# Patient Record
Sex: FEMALE | Race: WHITE | NOT HISPANIC OR LATINO | Employment: FULL TIME | URBAN - METROPOLITAN AREA
[De-identification: names, ages, dates, MRNs, and addresses within clinical notes are randomized per-mention and may not be internally consistent; named-entity substitution may affect disease eponyms.]

---

## 2017-04-19 ENCOUNTER — TELEPHONE (OUTPATIENT)
Dept: INTERNAL MEDICINE | Facility: CLINIC | Age: 32
End: 2017-04-19

## 2017-04-19 NOTE — TELEPHONE ENCOUNTER
S/w pt advising her unable to fill either citalopram or clonazepam without appt with Dr. Wolf, d/t pt previous records shows hasn't been seen by him since 02/2016 (over 1 year). Pt stated unable to pay cash price to see him at [facility], advised pt to go to nearest ER or call insurance for available provider that can see her as soon as possible to evaluate and treat her, she voiced understanding.

## 2017-04-19 NOTE — TELEPHONE ENCOUNTER
----- Message from Angela Mas sent at 4/19/2017  4:30 PM CDT -----  Contact: pt  Pt request call back concerning a prescription refill, pt can be reached at [phone]//thxMW

## 2018-09-12 ENCOUNTER — LAB VISIT (OUTPATIENT)
Dept: LAB | Facility: HOSPITAL | Age: 33
End: 2018-09-12
Attending: NURSE PRACTITIONER
Payer: COMMERCIAL

## 2018-09-12 ENCOUNTER — OFFICE VISIT (OUTPATIENT)
Dept: OBSTETRICS AND GYNECOLOGY | Facility: CLINIC | Age: 33
End: 2018-09-12
Payer: COMMERCIAL

## 2018-09-12 VITALS
BODY MASS INDEX: 34.55 KG/M2 | WEIGHT: 202.38 LBS | HEIGHT: 64 IN | SYSTOLIC BLOOD PRESSURE: 118 MMHG | DIASTOLIC BLOOD PRESSURE: 82 MMHG

## 2018-09-12 DIAGNOSIS — Z01.419 ENCOUNTER FOR GYNECOLOGICAL EXAMINATION WITHOUT ABNORMAL FINDING: Primary | ICD-10-CM

## 2018-09-12 DIAGNOSIS — F17.200 SMOKER: ICD-10-CM

## 2018-09-12 DIAGNOSIS — Z11.3 SCREENING FOR STD (SEXUALLY TRANSMITTED DISEASE): ICD-10-CM

## 2018-09-12 DIAGNOSIS — Z30.432 ENCOUNTER FOR IUD REMOVAL: ICD-10-CM

## 2018-09-12 PROCEDURE — 87491 CHLMYD TRACH DNA AMP PROBE: CPT

## 2018-09-12 PROCEDURE — 58301 REMOVE INTRAUTERINE DEVICE: CPT | Mod: S$GLB,,, | Performed by: NURSE PRACTITIONER

## 2018-09-12 PROCEDURE — 86703 HIV-1/HIV-2 1 RESULT ANTBDY: CPT

## 2018-09-12 PROCEDURE — 80074 ACUTE HEPATITIS PANEL: CPT

## 2018-09-12 PROCEDURE — 99999 PR PBB SHADOW E&M-EST. PATIENT-LVL III: CPT | Mod: PBBFAC,,, | Performed by: NURSE PRACTITIONER

## 2018-09-12 PROCEDURE — 88175 CYTOPATH C/V AUTO FLUID REDO: CPT

## 2018-09-12 PROCEDURE — 36415 COLL VENOUS BLD VENIPUNCTURE: CPT | Mod: PO

## 2018-09-12 PROCEDURE — 99385 PREV VISIT NEW AGE 18-39: CPT | Mod: 25,S$GLB,, | Performed by: NURSE PRACTITIONER

## 2018-09-12 PROCEDURE — 86592 SYPHILIS TEST NON-TREP QUAL: CPT

## 2018-09-12 NOTE — PATIENT INSTRUCTIONS
Kicking the Smoking Habit  If you smoke, quitting is one of the best changes you can make for your heart and your overall health. Your risk of heart attack goes down within one day of putting out that last cigarette. As you go longer without smoking, your risk goes down even more. Quitting isnt easy, but millions of people have done it. You can, too. Its never too late to quit.  Getting started  Boost your chances of success by deciding on your quit plan. Your health care provider and cardiac rehab team can help you develop this plan. Even if youve already quit, its easy to slip back into smoking.  Your plan can help you avoid and recover from relapse.  In any case, start by setting a date to quit within a month, and do it.    Keys to your quit plan  · Talk to your healthcare provider about prescription medicines and nicotine replacement products that help stop the urge to smoke.   · Join a support group or quit smoking program. Talking with others about the challenges of quitting can help you get through them.  · Ask other smokers in your household to quit with you.  · Look for the cues in your life that you associate with smoking and avoid them.  Track your triggers  What gives you that Z-cpsj-y-cigarette feeling? List all the situations that make you want a cigarette. Then think of other ways to deal with these situations. Here are some examples:  Situation How I'll handle it   Finishing a meal Get up from the table and take a walk   Having an argument Find a quiet place and breathe deeply   Feeling lonely or bored Call a friend to talk         Tips for quitting successfully  · List the benefits of quitting such as reducing heart risks and saving money. Keep this list and review it whenever you feel like smoking.  · Get support. Let your friends know you may call them to chat when you have an urge to smoke.  · If youve tried to quit before without success, this time avoid the triggers that may cause  the relapse.  · Make the most of slip-ups. Try to learn from them, and then get back on track.  · Be accountable to your friends and your calendar so that you stay on track.  For family and friends  · Be supportive and patient. Quitting smoking can be difficult and stressful.  · If you smoke, nows a great time to quit. Even if you dont quit, never smoke around your loved one. Secondhand smoke is dangerous to his or her heart.  · The best goals are accomplished in teams. Remember that when your loved one states he or she wants to stop smoking.  Date Last Reviewed: 7/1/2016  © 4938-8856 [employer]. [address] [place] [address], [place] [address]. All rights reserved. This information is not intended as a substitute for professional medical care. Always follow your healthcare professional's instructions.        The Range of Pap Test Results  When your Pap test is sent to the lab, the lab studies your cell samples and reports any abnormal cell changes. Your health care provider can discuss these changes with you. In some cases, an abnormal Pap test is due to an infection. More serious cell changes range from dysplasia to cancer. Talk to your health care provider about your Pap test.    Normal results  Cervical cells, even normal ones, are always changing. As they mature, normal squamous cells move from deeper layers within the cervix. Over time, these cells flatten and cover the surface of the cervix. Within the cervical canal, the cells are different. These glandular cells are taller and not as flat as the cells on the surface of the cervix. When a Pap test sample shows healthy cells of both types, the results are negative. Keep having Pap tests as often as directed.  Abnormal results  A positive Pap test result means some cells in the sample showed abnormal changes. These results are grouped by the type of cell change and the location, or extent, of the changes. Depending on the results, you may need  further testing.  · Inflammation: Noncancerous changes are present. They may be due to normal cell repair. Or, they may be caused by an infection, such as HPV or yeast. Further testing may be needed. (Also called reactive cellular changes.)  · Atypical squamous cells: Test results are unclear. Cells on the surface of the cervix show changes, but their significance is not yet known. Testing for HPV and other sexually transmitted infections (STIs) may be needed. Treatment may be required. (Reported as ASC-US or ASC-H.)  · Atypical glandular cells: Cells lining the cervical canal show abnormal changes. Further testing is likely. You may also have treatment to destroy or remove problem cells. (Reported as AGC.)  · Mild dysplasia: Cells show distinct changes. More testing or HPV typing may be done. You may also have treatment to destroy or remove problem cells. (Reported as low-grade RAFIQ or REYNOLD 1.)  · Moderate to severe dysplasia: Cells show precancerous changes. Or, noninvasive cancer (carcinoma in situ) may be present. Treatment to destroy or remove problem cells is likely. (Reported as high-grade RAFIQ or REYNOLD 2 or REYNOLD 3.)  · Cancer: Different types of cancer may be detected by your Pap test. More tests to assess the cancer's extent are likely. The type of treatment will depend on the test results and other factors, such as age and health history. (Reported as squamous cell carcinoma, endocervical adenocarcinoma in situ, or adenocarcinoma.)  Date Last Reviewed: 5/12/2015  © 3882-0118 [employer]. [address] [place] [address], [place] [address]. All rights reserved. This information is not intended as a substitute for professional medical care. Always follow your healthcare professional's instructions.

## 2018-09-12 NOTE — PROGRESS NOTES
"CC: Well woman exam    Veronika Chávez is a 32 y.o. female  presents for well woman exam.  LMP: No LMP recorded. Patient is not currently having periods (Reason: Birth Control)..    IUD was placed at other gyn in 2017.  Pt states just has not felt herself since placement, dimas, emotion labile.  Does not want pregnancy and wants other options for birth control.  She smokes 1/2 pack a day of cigarettes.  Discussed limited options for contraception: ParaGard IUD, depo provera, Nexplanon, condoms.      Past Medical History:   Diagnosis Date    Anxiety      Past Surgical History:   Procedure Laterality Date    DILATION AND CURETTAGE OF UTERUS       Social History     Socioeconomic History    Marital status: Single     Spouse name: Not on file    Number of children: Not on file    Years of education: Not on file    Highest education level: Not on file   Social Needs    Financial resource strain: Not on file    Food insecurity - worry: Not on file    Food insecurity - inability: Not on file    Transportation needs - medical: Not on file    Transportation needs - non-medical: Not on file   Occupational History    Not on file   Tobacco Use    Smoking status: Current Every Day Smoker   Substance and Sexual Activity    Alcohol use: Yes    Drug use: No    Sexual activity: Yes     Partners: Male     Birth control/protection: None   Other Topics Concern    Not on file   Social History Narrative    Not on file     Family History   Problem Relation Age of Onset    Stroke Maternal Grandmother     Hypertension Maternal Grandfather      OB History      Para Term  AB Living    1 0 0 0 1 0    SAB TAB Ectopic Multiple Live Births    0 1 0 0            /82   Ht 5' 4" (1.626 m)   Wt 91.8 kg (202 lb 6.1 oz)   BMI 34.74 kg/m²       ROS:  GENERAL: Denies weight gain or weight loss. Feeling well overall.   SKIN: Denies rash or lesions.   HEAD: Denies head injury or headache. "   NODES: Denies enlarged lymph nodes.   CHEST: Denies chest pain or shortness of breath.   CARDIOVASCULAR: Denies palpitations or left sided chest pain.   ABDOMEN: No abdominal pain, constipation, diarrhea, nausea, vomiting or rectal bleeding.   URINARY: No frequency, dysuria, hematuria, or burning on urination.  REPRODUCTIVE: See HPI.   BREASTS: The patient performs breast self-examination and denies pain, lumps, or nipple discharge.   HEMATOLOGIC: No easy bruisability or excessive bleeding.   MUSCULOSKELETAL: Denies joint pain or swelling.   NEUROLOGIC: Denies syncope or weakness.   PSYCHIATRIC: Denies depression, anxiety or mood swings.    PHYSICAL EXAM:  APPEARANCE: Well nourished, well developed, in no acute distress.  AFFECT: WNL, alert and oriented x 3  SKIN: No acne or hirsutism  NECK: Neck symmetric without masses or thyromegaly  NODES: No inguinal, cervical, axillary, or femoral lymph node enlargement  CHEST: Good respiratory effect  ABDOMEN: Soft.  No tenderness or masses.  No hepatosplenomegaly.  No hernias.  BREASTS: Symmetrical, no skin changes or visible lesions.  No palpable masses, nipple discharge bilaterally.  PELVIC: Normal external genitalia without lesions.  Normal hair distribution.  Adequate perineal body, normal urethral meatus.  Vagina moist and well rugated without lesions or discharge.  Cervix pink, without lesions, discharge or tenderness.  No significant cystocele or rectocele.  Bimanual exam shows uterus to be normal size, regular, mobile and nontender.  Adnexa without masses or tenderness.    EXTREMITIES: No edema.  Physical Exam     CC: Removal of IUD    Veronika Chávez is a 32 y.o. female  presents for IUD removal because does not like how it effects her emotions - offered antidepressant since IUD has not been in but about 9 mths she declines and would like it removed today.      PRE-IUD REMOVAL COUNSELING:  The patient was advised of minimal risks of bleeding and pain and  she agrees to proceed.    PROCEDURE:  TIME OUT PERFORMED.  IUD strings were  visualized at the os and grasped. IUD removed with gentle traction.  The patient tolerated the procedure well.    ASSESSMENT:  Contraceptive Management / Removal IUD.    POST IUD REMOVAL COUNSELING:  Expect period-like flow to occur after Mirena IUD removal and periods to return to pre-IUD pattern.  Manage post IUD removal cramping with NSAIDs, Tylenol or Rx per [employer].    POST IUD REMOVAL CONTRACEPTION:  Condom use, paragard pamphlet given  Stop smoking and options increase    Counseling lasted approximately 15 minutes and all her questions were answered.    FOLLOW-UP: With me for annual gyn exam or prn.    1. Encounter for gynecological examination without abnormal finding  Liquid-based pap smear, screening   2. Screening for STD (sexually transmitted disease)  C. trachomatis/N. gonorrhoeae by AMP DNA    HIV-1 and HIV-2 antibodies    Hepatitis panel, acute    RPR   3. Encounter for IUD removal  Removal of Intrauterine Device-Today   4. Smoker      AND PLAN:    Patient was counseled today on A.C.S. Pap guidelines and recommendations for yearly pelvic exams, mammograms and monthly self breast exams; to see her PCP for other health maintenance.

## 2018-09-13 LAB
C TRACH DNA SPEC QL NAA+PROBE: NOT DETECTED
HAV IGM SERPL QL IA: NEGATIVE
HBV CORE IGM SERPL QL IA: NEGATIVE
HBV SURFACE AG SERPL QL IA: NEGATIVE
HCV AB SERPL QL IA: NEGATIVE
HIV 1+2 AB+HIV1 P24 AG SERPL QL IA: NEGATIVE
N GONORRHOEA DNA SPEC QL NAA+PROBE: NOT DETECTED
RPR SER QL: NORMAL

## 2018-09-14 ENCOUNTER — PATIENT MESSAGE (OUTPATIENT)
Dept: OBSTETRICS AND GYNECOLOGY | Facility: CLINIC | Age: 33
End: 2018-09-14

## 2018-09-17 ENCOUNTER — PATIENT MESSAGE (OUTPATIENT)
Dept: OBSTETRICS AND GYNECOLOGY | Facility: CLINIC | Age: 33
End: 2018-09-17

## 2018-11-14 ENCOUNTER — OFFICE VISIT (OUTPATIENT)
Dept: INTERNAL MEDICINE | Facility: CLINIC | Age: 33
End: 2018-11-14
Payer: COMMERCIAL

## 2018-11-14 ENCOUNTER — LAB VISIT (OUTPATIENT)
Dept: LAB | Facility: HOSPITAL | Age: 33
End: 2018-11-14
Attending: NURSE PRACTITIONER
Payer: COMMERCIAL

## 2018-11-14 VITALS
DIASTOLIC BLOOD PRESSURE: 84 MMHG | TEMPERATURE: 98 F | BODY MASS INDEX: 34.45 KG/M2 | HEIGHT: 64 IN | OXYGEN SATURATION: 99 % | HEART RATE: 84 BPM | WEIGHT: 201.81 LBS | SYSTOLIC BLOOD PRESSURE: 108 MMHG

## 2018-11-14 DIAGNOSIS — R53.83 FATIGUE, UNSPECIFIED TYPE: Primary | ICD-10-CM

## 2018-11-14 DIAGNOSIS — Z72.0 TOBACCO USE: ICD-10-CM

## 2018-11-14 DIAGNOSIS — F17.200 CURRENT SMOKER: ICD-10-CM

## 2018-11-14 DIAGNOSIS — R53.83 FATIGUE, UNSPECIFIED TYPE: ICD-10-CM

## 2018-11-14 DIAGNOSIS — R06.02 SHORTNESS OF BREATH: ICD-10-CM

## 2018-11-14 DIAGNOSIS — H65.91 FLUID LEVEL BEHIND TYMPANIC MEMBRANE OF RIGHT EAR: ICD-10-CM

## 2018-11-14 LAB
25(OH)D3+25(OH)D2 SERPL-MCNC: 11 NG/ML
ALBUMIN SERPL BCP-MCNC: 3.9 G/DL
ALP SERPL-CCNC: 72 U/L
ALT SERPL W/O P-5'-P-CCNC: 14 U/L
ANION GAP SERPL CALC-SCNC: 9 MMOL/L
AST SERPL-CCNC: 16 U/L
BASOPHILS # BLD AUTO: 0.04 K/UL
BASOPHILS NFR BLD: 0.4 %
BILIRUB SERPL-MCNC: 0.4 MG/DL
BUN SERPL-MCNC: 10 MG/DL
CALCIUM SERPL-MCNC: 9.9 MG/DL
CHLORIDE SERPL-SCNC: 105 MMOL/L
CHOLEST SERPL-MCNC: 216 MG/DL
CHOLEST/HDLC SERPL: 4.7 {RATIO}
CO2 SERPL-SCNC: 25 MMOL/L
CREAT SERPL-MCNC: 0.7 MG/DL
DIFFERENTIAL METHOD: ABNORMAL
EOSINOPHIL # BLD AUTO: 0.5 K/UL
EOSINOPHIL NFR BLD: 5 %
ERYTHROCYTE [DISTWIDTH] IN BLOOD BY AUTOMATED COUNT: 12.4 %
EST. GFR  (AFRICAN AMERICAN): >60 ML/MIN/1.73 M^2
EST. GFR  (NON AFRICAN AMERICAN): >60 ML/MIN/1.73 M^2
GLUCOSE SERPL-MCNC: 98 MG/DL
HCT VFR BLD AUTO: 42.3 %
HDLC SERPL-MCNC: 46 MG/DL
HDLC SERPL: 21.3 %
HGB BLD-MCNC: 14.1 G/DL
LDLC SERPL CALC-MCNC: 140.4 MG/DL
LYMPHOCYTES # BLD AUTO: 3.2 K/UL
LYMPHOCYTES NFR BLD: 30.3 %
MCH RBC QN AUTO: 31.1 PG
MCHC RBC AUTO-ENTMCNC: 33.3 G/DL
MCV RBC AUTO: 93 FL
MONOCYTES # BLD AUTO: 0.4 K/UL
MONOCYTES NFR BLD: 4.1 %
NEUTROPHILS # BLD AUTO: 6.3 K/UL
NEUTROPHILS NFR BLD: 60.2 %
NONHDLC SERPL-MCNC: 170 MG/DL
PLATELET # BLD AUTO: 303 K/UL
PMV BLD AUTO: 9.8 FL
POTASSIUM SERPL-SCNC: 4.8 MMOL/L
PROT SERPL-MCNC: 7 G/DL
RBC # BLD AUTO: 4.54 M/UL
SODIUM SERPL-SCNC: 139 MMOL/L
TRIGL SERPL-MCNC: 148 MG/DL
TSH SERPL DL<=0.005 MIU/L-ACNC: 1.75 UIU/ML
VIT B12 SERPL-MCNC: 573 PG/ML
WBC # BLD AUTO: 10.46 K/UL

## 2018-11-14 PROCEDURE — 3008F BODY MASS INDEX DOCD: CPT | Mod: CPTII,S$GLB,, | Performed by: NURSE PRACTITIONER

## 2018-11-14 PROCEDURE — 80061 LIPID PANEL: CPT | Mod: PO

## 2018-11-14 PROCEDURE — 36415 COLL VENOUS BLD VENIPUNCTURE: CPT | Mod: PO

## 2018-11-14 PROCEDURE — 99213 OFFICE O/P EST LOW 20 MIN: CPT | Mod: S$GLB,,, | Performed by: NURSE PRACTITIONER

## 2018-11-14 PROCEDURE — 99999 PR PBB SHADOW E&M-EST. PATIENT-LVL III: CPT | Mod: PBBFAC,,, | Performed by: NURSE PRACTITIONER

## 2018-11-14 PROCEDURE — 82306 VITAMIN D 25 HYDROXY: CPT

## 2018-11-14 PROCEDURE — 82607 VITAMIN B-12: CPT

## 2018-11-14 PROCEDURE — 80053 COMPREHEN METABOLIC PANEL: CPT | Mod: PO

## 2018-11-14 PROCEDURE — 85025 COMPLETE CBC W/AUTO DIFF WBC: CPT | Mod: PO

## 2018-11-14 PROCEDURE — 84443 ASSAY THYROID STIM HORMONE: CPT

## 2018-11-14 NOTE — PROGRESS NOTES
Subjective:       Patient ID: Veronika Chávez is a 33 y.o. female.    Chief Complaint: Fatigue    Patient presents with fatigue that started within the past week. Went to [facility] urgent care for symptoms.  Reports she was negative for flu and strep.  Had CXR and says provider saw some scarring.  Patient is a current smoker (15 year history).  No lung disease.  Had pneumonia in the past.  Complains for some shortness of breath on exertion.  Mild palpitations.  Came in today for labs.  Has not had any in years. No PCP.  Wanting to establish with a provider.       Fatigue   This is a new problem. The current episode started in the past 7 days. The problem has been unchanged. Associated symptoms include chills and fatigue. Pertinent negatives include no arthralgias, congestion, coughing or headaches. Treatments tried: Mariluz Conde Elda Berry      Review of Systems   Constitutional: Positive for chills and fatigue.   HENT: Negative for congestion.    Respiratory: Positive for shortness of breath. Negative for cough.    Cardiovascular: Positive for palpitations.   Musculoskeletal: Negative for arthralgias and gait problem.   Neurological: Negative for dizziness and headaches.   Psychiatric/Behavioral: Negative for agitation and confusion.       Objective:      Physical Exam   Constitutional: She is oriented to person, place, and time. Vital signs are normal. She appears well-developed and well-nourished.   HENT:   Head: Normocephalic and atraumatic.   Left Ear: A middle ear effusion is present.   Neck: Normal range of motion.   Cardiovascular: Normal rate and regular rhythm.   Pulmonary/Chest: Effort normal and breath sounds normal.   Abdominal: Soft. Bowel sounds are normal.   Musculoskeletal: Normal range of motion. She exhibits no edema.   Neurological: She is alert and oriented to person, place, and time.   Skin: Skin is warm.   Psychiatric: She has a normal mood and affect. Her behavior is normal.        Assessment:       1. Fatigue, unspecified type    2. Current smoker    3. Shortness of breath    4. Tobacco use    5. Fluid level behind tympanic membrane of right ear        Plan:         Fatigue, unspecified type  -     CBC auto differential; Future; Expected date: 11/14/2018  -     Comprehensive metabolic panel; Future; Expected date: 11/14/2018  -     TSH; Future; Expected date: 11/14/2018  -     Vitamin D; Future; Expected date: 11/14/2018  -     Vitamin B12; Future; Expected date: 11/14/2018  -     Lipid panel; Future; Expected date: 11/14/2018    Current smoker  -     CT Chest Without Contrast; Future; Expected date: 11/14/2018    Shortness of breath  -     CT Chest Without Contrast; Future; Expected date: 11/14/2018    Tobacco use  -     CT Chest Without Contrast; Future; Expected date: 11/14/2018    Fluid level behind tympanic membrane of right ear  Comments:  Start back taking Allegra         Labs today.  Schedule CT of chest.  Will schedule follow up after labs.

## 2018-11-14 NOTE — LETTER
November 14, 2018             [facility]  Internal Medicine  [address]  [place] [address]  Phone: [phone]  Fax: [phone]   November 14, 2018     Patient: Veronika Chávez   YOB: 1985   Date of Visit: 11/14/2018       To Whom it May Concern:    Veronika Chávez was seen in my clinic on 11/14/2018. She may return to work on 11/19/2018.    If you have any questions or concerns, please don't hesitate to call.    Sincerely,         Romelia Granger NP

## 2018-11-15 DIAGNOSIS — E55.9 VITAMIN D INSUFFICIENCY: ICD-10-CM

## 2018-11-15 RX ORDER — ERGOCALCIFEROL 1.25 MG/1
50000 CAPSULE ORAL
Qty: 12 CAPSULE | Refills: 3 | Status: SHIPPED | OUTPATIENT
Start: 2018-11-15

## 2018-12-18 ENCOUNTER — PATIENT MESSAGE (OUTPATIENT)
Dept: INTERNAL MEDICINE | Facility: CLINIC | Age: 33
End: 2018-12-18

## 2023-03-06 ENCOUNTER — TELEPHONE (OUTPATIENT)
Dept: INTERNAL MEDICINE | Facility: CLINIC | Age: 38
End: 2023-03-06
Payer: COMMERCIAL

## 2023-03-06 ENCOUNTER — PATIENT MESSAGE (OUTPATIENT)
Dept: INTERNAL MEDICINE | Facility: CLINIC | Age: 38
End: 2023-03-06
Payer: COMMERCIAL

## 2023-03-07 ENCOUNTER — TELEPHONE (OUTPATIENT)
Dept: INTERNAL MEDICINE | Facility: CLINIC | Age: 38
End: 2023-03-07
Payer: COMMERCIAL

## 2023-10-28 ENCOUNTER — OFFICE VISIT (OUTPATIENT)
Dept: URGENT CARE | Facility: CLINIC | Age: 38
End: 2023-10-28
Payer: COMMERCIAL

## 2023-10-28 VITALS
RESPIRATION RATE: 16 BRPM | SYSTOLIC BLOOD PRESSURE: 138 MMHG | TEMPERATURE: 99 F | HEART RATE: 105 BPM | OXYGEN SATURATION: 97 % | DIASTOLIC BLOOD PRESSURE: 78 MMHG | BODY MASS INDEX: 36.31 KG/M2 | WEIGHT: 217.94 LBS | HEIGHT: 65 IN

## 2023-10-28 DIAGNOSIS — J06.9 VIRAL URI: Primary | ICD-10-CM

## 2023-10-28 DIAGNOSIS — R05.9 COUGH, UNSPECIFIED TYPE: ICD-10-CM

## 2023-10-28 LAB
CTP QC/QA: YES
CTP QC/QA: YES
POC MOLECULAR INFLUENZA A AGN: NEGATIVE
POC MOLECULAR INFLUENZA B AGN: NEGATIVE
SARS-COV-2 AG RESP QL IA.RAPID: NEGATIVE

## 2023-10-28 PROCEDURE — 99204 OFFICE O/P NEW MOD 45 MIN: CPT | Mod: S$GLB,,, | Performed by: NURSE PRACTITIONER

## 2023-10-28 PROCEDURE — 87502 POCT INFLUENZA A/B MOLECULAR: ICD-10-PCS | Mod: QW,S$GLB,, | Performed by: NURSE PRACTITIONER

## 2023-10-28 PROCEDURE — 87811 SARS CORONAVIRUS 2 ANTIGEN POCT, MANUAL READ: ICD-10-PCS | Mod: QW,S$GLB,, | Performed by: NURSE PRACTITIONER

## 2023-10-28 PROCEDURE — 87502 INFLUENZA DNA AMP PROBE: CPT | Mod: QW,S$GLB,, | Performed by: NURSE PRACTITIONER

## 2023-10-28 PROCEDURE — 99204 PR OFFICE/OUTPT VISIT, NEW, LEVL IV, 45-59 MIN: ICD-10-PCS | Mod: S$GLB,,, | Performed by: NURSE PRACTITIONER

## 2023-10-28 PROCEDURE — 87811 SARS-COV-2 COVID19 W/OPTIC: CPT | Mod: QW,S$GLB,, | Performed by: NURSE PRACTITIONER

## 2023-10-28 RX ORDER — DEXTROAMPHETAMINE SACCHARATE, AMPHETAMINE ASPARTATE MONOHYDRATE, DEXTROAMPHETAMINE SULFATE AND AMPHETAMINE SULFATE 5; 5; 5; 5 MG/1; MG/1; MG/1; MG/1
CAPSULE, EXTENDED RELEASE ORAL EVERY MORNING
COMMUNITY
Start: 2023-10-10

## 2023-10-28 RX ORDER — FLUOXETINE HYDROCHLORIDE 20 MG/1
20 CAPSULE ORAL
COMMUNITY
Start: 2023-10-23

## 2023-10-28 RX ORDER — ALBUTEROL SULFATE 90 UG/1
2 AEROSOL, METERED RESPIRATORY (INHALATION) EVERY 6 HOURS PRN
Qty: 18 G | Refills: 1 | Status: SHIPPED | OUTPATIENT
Start: 2023-10-28

## 2023-10-28 RX ORDER — DEXAMETHASONE 4 MG/1
4 TABLET ORAL DAILY
Qty: 5 TABLET | Refills: 0 | Status: SHIPPED | OUTPATIENT
Start: 2023-10-28 | End: 2023-11-02

## 2023-10-28 RX ORDER — AZELASTINE 1 MG/ML
1 SPRAY, METERED NASAL 2 TIMES DAILY
Qty: 30 ML | Refills: 2 | Status: SHIPPED | OUTPATIENT
Start: 2023-10-28

## 2023-10-28 NOTE — PROGRESS NOTES
"Subjective:      Patient ID: Veronika Chávez is a 37 y.o. female.    Vitals:  height is 5' 5.39" (1.661 m) and weight is 98.9 kg (217 lb 14.8 oz). Her tympanic temperature is 99 °F (37.2 °C). Her blood pressure is 138/78 and her pulse is 105. Her respiration is 16 and oxygen saturation is 97%.     Chief Complaint: Sinus Problem    Patient presents with sinus pressure, headache, congestion, cough, body aches.  Symptoms started 3 days ago.  Patient states that she took sudafed along with her adderall and her heart was racing yesterday.    Sinus Problem  This is a new problem. The current episode started in the past 7 days (3). The problem has been gradually worsening since onset. There has been no fever. Associated symptoms include congestion, coughing, diaphoresis, headaches, neck pain and sinus pressure. Pertinent negatives include no chills, ear pain or sore throat. Past treatments include oral decongestants (zyrtec). The treatment provided no relief.       Constitution: Positive for sweating. Negative for chills.   HENT:  Positive for congestion and sinus pressure. Negative for ear pain and sore throat.    Neck: Positive for neck pain.   Respiratory:  Positive for cough.    Neurological:  Positive for headaches.      Objective:     Vitals:    10/28/23 1148   BP: 138/78   BP Location: Left arm   Patient Position: Sitting   BP Method: Large (Automatic)   Pulse: 105   Resp: 16   Temp: 99 °F (37.2 °C)   TempSrc: Tympanic   SpO2: 97%   Weight: 98.9 kg (217 lb 14.8 oz)   Height: 5' 5.39" (1.661 m)       Physical Exam   Constitutional: She is oriented to person, place, and time. She appears well-developed. She is cooperative.  Non-toxic appearance. She does not appear ill. No distress.   HENT:   Head: Normocephalic and atraumatic.   Ears:   Right Ear: Hearing, external ear and ear canal normal. A middle ear effusion is present.   Left Ear: Hearing, external ear and ear canal normal. A middle ear effusion is present. "   Nose: Rhinorrhea and congestion present. No mucosal edema or nasal deformity. No epistaxis. Right sinus exhibits no maxillary sinus tenderness and no frontal sinus tenderness. Left sinus exhibits no maxillary sinus tenderness and no frontal sinus tenderness.   Mouth/Throat: Uvula is midline, oropharynx is clear and moist and mucous membranes are normal. Mucous membranes are moist. No trismus in the jaw. Normal dentition. No uvula swelling. No oropharyngeal exudate, posterior oropharyngeal edema or posterior oropharyngeal erythema.   Eyes: Conjunctivae and lids are normal. Pupils are equal, round, and reactive to light. No scleral icterus. Extraocular movement intact   Neck: Trachea normal and phonation normal. Neck supple. No edema present. No erythema present. No neck rigidity present.   Cardiovascular: Normal rate, regular rhythm, normal heart sounds and normal pulses.   Pulmonary/Chest: Effort normal. No respiratory distress. She has no decreased breath sounds. She has wheezes. She has no rhonchi.   Abdominal: Normal appearance.   Musculoskeletal: Normal range of motion.         General: No deformity. Normal range of motion.   Neurological: She is alert and oriented to person, place, and time. She exhibits normal muscle tone. Coordination normal.   Skin: Skin is warm, dry, intact, not diaphoretic and not pale.   Psychiatric: Her speech is normal and behavior is normal. Judgment and thought content normal.   Nursing note and vitals reviewed.      Assessment:     1. Viral URI    2. Cough, unspecified type      Results for orders placed or performed in visit on 10/28/23   SARS Coronavirus 2 Antigen, POCT Manual Read   Result Value Ref Range    SARS Coronavirus 2 Antigen Negative Negative     Acceptable Yes    POCT Influenza A/B MOLECULAR   Result Value Ref Range    POC Molecular Influenza A Ag Negative Negative, Not Reported    POC Molecular Influenza B Ag Negative Negative, Not Reported    Quality  Control Acceptable Yes         Plan:   Declined smoking cessation referral       Viral URI  -     azelastine (ASTELIN) 137 mcg (0.1 %) nasal spray; 1 spray (137 mcg total) by Nasal route 2 (two) times daily.  Dispense: 30 mL; Refill: 2    Cough, unspecified type  -     SARS Coronavirus 2 Antigen, POCT Manual Read  -     POCT Influenza A/B MOLECULAR  -     dexAMETHasone (DECADRON) 4 MG Tab; Take 1 tablet (4 mg total) by mouth once daily. Take with food for 5 days  Dispense: 5 tablet; Refill: 0  -     albuterol (VENTOLIN HFA) 90 mcg/actuation inhaler; Inhale 2 puffs into the lungs every 6 (six) hours as needed for Wheezing. Inhaler  Dispense: 18 g; Refill: 1      Patient Instructions     Patient Instructions   Increase fluids   Rest activity ad shameka   Tylenol 650 mg every 4-6 hrs as needed for fever headache body aches   Advil 200 mg 2-3 tabs every 6 hrs as needed for fever, headache body aches---Must take with food   Supportive care measures   Astelin nasal spray twice a day   Viral infections usually resolve in 7-10 days; If symptoms persist or worsen follow up UC or PCP   Repeat Covid testing  as needed

## 2023-10-28 NOTE — LETTER
October 28, 2023      [facility]  [address], [address]  [facility] [address]  Phone: [phone]  Fax: [phone]       Patient: Veronika Chávez   YOB: 1985  Date of Visit: 10/28/2023    To Whom It May Concern:    Bruna Chávze  was at [facility] on 10/28/2023. The patient may return to work/school on 10/31/2023  with no restrictions. If you have any questions or concerns, or if I can be of further assistance, please do not hesitate to contact me.    Sincerely,          Errol Lua, NP

## 2023-10-28 NOTE — PATIENT INSTRUCTIONS
Patient Instructions   Increase fluids   Rest activity ad shameka   Tylenol 650 mg every 4-6 hrs as needed for fever headache body aches   Advil 200 mg 2-3 tabs every 6 hrs as needed for fever, headache body aches---Must take with food   Supportive care measures   Astelin nasal spray twice a day   Viral infections usually resolve in 7-10 days; If symptoms persist or worsen follow up UC or PCP   Repeat Covid testing  as needed

## 2023-10-31 ENCOUNTER — TELEPHONE (OUTPATIENT)
Dept: URGENT CARE | Facility: CLINIC | Age: 38
End: 2023-10-31
Payer: COMMERCIAL